# Patient Record
(demographics unavailable — no encounter records)

---

## 2024-11-07 NOTE — HISTORY OF PRESENT ILLNESS
[de-identified] : 68 year-old Ms. DICKEY is seen in consult for leukopenia that was first noted in 03/2023 when her WBC was 3.47, ANC 1.78. Her PCP repeated a CBC in April 2023 which showed even lower WBC (2.74). She has had normal counts before. Her H/H and Platelets are normal range. Patient denies being sick during this time except having a cold. She endorses being in good health all along. She denies any infection, fever. fatigue, weight loss. She denies taking any new drugs. Of note patient admits taking a beer most days.  [de-identified] : 6/23/2023: Initial visit  9/27/2023 Patient seen in follow up for leukopenia. Interim, she's been in good health. Denies frequent infection or illness. No fevers, night sweats, unintentional weight loss. Her last WBC was normal. Today her WBC is 2.94. No health concerns at this time. Will be traveling and returning in November.   5/2/2024 Patient returns for follow up for leukopenia. Her ANC and ALC remains boarder line low. She denies any infection, fever, fatigue, weight loss, night sweats. She has been in stable health and offers no complaints. She is going to get the second dose of her Shingles shot tomorrow. No concerns.   11/7/2024 Patient seen in follow up today. She has done well in the interim. Denies any infection, fever, fatigue, night sweats, weight loss. Her leukopenia is stable and persistent. Probably this is her normal count.  Discussed that there is no need for follow up any, more as her condition and the counts are stable. She voiced understanding.   *

## 2024-11-07 NOTE — ASSESSMENT
[FreeTextEntry1] : 68 year-old Ms. DICKEY is seen in consult for low WBC count contributed likely by neutrophils. Her hgb and platelets are normal range, MCV is normal. She denies any s/s of infections. Her WBC on her initial visit was normal. Suggestive of cyclic neutropenia, although a primary bone marrow process cannot be ruled out. Will follow up with surveillance, no plan for BMB now.   [] Leukopenia, neutropenia and lymphocytopenia, mild  - Had normal ANC in the until 12/2021 (per EMR)  - Normal H/H, MCV and Platelets  - No s/s of infection  - No new medication on her list  - Unclear etiology  - This may be her normal count - Cannot r/o early MDS  - No plan for BMBx at this time   - LOURDES Negative, Hepatitis, HIV - NEGATIVE - No routine hematology f/u needed  - CBC with PCP once a year  - Discharge from Hematology clinic  - Reconsult if needed   *       RTC in 6 months

## 2024-12-19 NOTE — PHYSICAL EXAM
[No Lymphadenopathy] : no lymphadenopathy [Normal] : no jugular venous distention, supple, no lymphadenopathy and the thyroid was normal and there were no nodules present

## 2024-12-19 NOTE — HISTORY OF PRESENT ILLNESS
[de-identified] : 68 y/o F PMHX HTN presents for BP check. Needs med refill. Saw Hematology  nothing further. Node in left side of neck smaller non tender

## 2025-03-20 NOTE — HISTORY OF PRESENT ILLNESS
[de-identified] : 69 y/o F PMHx HLD (Crestor), HTN ( Amlodipine, Olmesartan) presents for Medicare PE.

## 2025-03-20 NOTE — HEALTH RISK ASSESSMENT
[Very Good] : ~his/her~  mood as very good [2 - 3 times a week (3 pts)] : 2 - 3  times a week (3 points) [1 or 2 (0 pts)] : 1 or 2 (0 points) [No] : In the past 12 months have you used drugs other than those required for medical reasons? No [No falls in past year] : Patient reported no falls in the past year [0] : 2) Feeling down, depressed, or hopeless: Not at all (0) [PHQ-2 Negative - No further assessment needed] : PHQ-2 Negative - No further assessment needed [Yes] : takes [None] : Patient does not have any barriers to medication adherence [Never] : Never [Patient reported colonoscopy was normal] : Patient reported colonoscopy was normal [Alone] : lives alone [Feels Safe at Home] : Feels safe at home [Fully functional (bathing, dressing, toileting, transferring, walking, feeding)] : Fully functional (bathing, dressing, toileting, transferring, walking, feeding) [Fully functional (using the telephone, shopping, preparing meals, housekeeping, doing laundry, using] : Fully functional and needs no help or supervision to perform IADLs (using the telephone, shopping, preparing meals, housekeeping, doing laundry, using transportation, managing medications and managing finances) [Smoke Detector] : smoke detector [Carbon Monoxide Detector] : carbon monoxide detector [Patient/Caregiver not ready to engage] : , patient/caregiver not ready to engage [de-identified] : waks daily [[id number]] : 0 [Change in mental status noted] : No change in mental status noted [Sexually Active] : not sexually active [Reports changes in hearing] : Reports no changes in hearing [Reports changes in vision] : Reports no changes in vision [Reports changes in dental health] : Reports no changes in dental health [MammogramComments] : declines [PapSmearComments] : declines [ColonoscopyDate] : 2024 [ColonoscopyComments] : Cologuard 2024 [AdvancecareDate] : 3/20/2024

## 2025-03-22 NOTE — RESULTS/DATA
No care due was identified.  [facility] Embedded Care Due Messages. Reference number: [id number].   3/21/2025 5:31:27 PM CDT  
Refill Routing Note   Medication(s) are not appropriate for processing by [facility] for the following reason(s):        New or recently adjusted medication    [facility] action(s):  Defer             Appointments  past 12m or future 3m with PCP    Date Provider   Last Visit   2/18/2025 Sadie Ha, DO   Next Visit   5/19/2025 Sadie Ha, DO   ED visits in past 90 days: 0        Note composed:9:28 PM 03/21/2025               
[FreeTextEntry1] : 11/7/2024 WBC 3.36 ANC 1.74 Normal HGB and PLT  5/2/2024 WBC 3.38, ANC 1.75, ALC 0.92 Normal HGB and PLT  9/28/2023 WBC 2.94 Hgb and platelet WNL   6/23/23 Available labs reviewed in the EMR. WBC count noted to be mildly low since 03/2023 Normal H/H and platelets  Normal MCV

## 2025-05-19 NOTE — PHYSICAL EXAM
[Chaperoned Physical Exam] : A chaperone was present in the examining room during all aspects of the physical examination. [Abnormal] : Bimanual Exam: Abnormal [Normal] : Recto-Vaginal Exam: Normal [Fully active, able to carry on all pre-disease performance without restriction] : Status 0 - Fully active, able to carry on all pre-disease performance without restriction [de-identified] : 10 cm mobile uterus, cervix normal appearing

## 2025-05-19 NOTE — DISCUSSION/SUMMARY
[Reviewed Clinical Lab Test(s)] : Results of clinical tests were reviewed. [Reviewed Radiology Report(s)] : Radiology reports were reviewed. [Reviewed Radiology Film/Image(s)] : Images from radiology studies were reviewed and examined. [Discuss Tests w/Referring Providers] : Results of labs/radiology studies and the treatment recommendations were discussed with performing/referring physician. [Discuss Alternatives/Risks/Benefits w/Patient] : All alternatives, risks, and benefits were discussed with the patient/family and all questions were answered.  Patient expressed good understanding and appreciates the importance of follow up as recommended. [FreeTextEntry1] : 71 yo with findings suspicious for metastatic endometrial malignancy.   EMB done today  PET CT ordered to investigate upper abdominal cystic lesion. Lymphatic origin??  Discussed surgical planning with robotic assisted TLH BSO LAD, cysto possible open   We have discussed the risks of the planned procedure at length as well as the benefits and alternatives.   We discussed risk of bleeding and the possibility of blood transfusions, which the patient is amenable to in the event that it is needed.   We have discussed the possibility of infection, including superficial infection of the wound, the underlying tissues or deep infection in the abdomen or pelvis, as well as urinary tract infection and pneumonia.  We discussed the steps taken to mitigate that risk; but the possibility of need for antibiotics, a prolonged hospital stay, hospital readmission, or ICU stay remain.  We discussed the individual risk of infection is different for each patient and each procedure.   We have discussed risk of damage to surrounding structures, including the large and small bowel, bladder and ureters, blood vessels, and nerves. We discussed that we attempt to identify and fix any injury at the time of surgery, but that this may involve a more extensive operation that originally planned, the possible involvement of additional surgical subspecialists and a longer hospital stay than is typical.    We discussed risk of problems with healing of wounds related to the surgery. In addition to infection, we discussed wound separation, seroma, hematoma as well as possible scarring when healing leading to unsightly wounds. We discussed care of the wound post-op but that many times these problems cannot be predicted or prevented.   There is an increased risk of VTE and PE related to surgery and hospitalization especially in the setting of a cancer diagnosis. We discussed administration of heparin pre-operatively as well as during hospitalization if necessary to prevent formation of blood clots. We also discussed extended prophylaxis for 28 days post-op, should cancer be present and the patient undergo an open procedure.   We have discussed the post operative expectations for the surgery as planned, but deviations from the surgical plan may be required in order to achieve the surgical goals. This may necessitate additional post operative care, in-hospital or at home as determined by the situation. We discussed possible removal of abnormal appearing tissue in the abdominal cavity if it is seen and appendectomy if abnormal appearing. We discussed that with any minimally invasive or laparoscopic/robotic procedure there is a low, but not zero risk of conversion to an open surgery.   We discussed that there may be medical students, residents and fellows present during her procedure and participating in her post-operative care under my direct supervision. We discussed the importance of individuals participating in the procedure, including myself and assistants, to perform exam under anesthesia.  Path PET PST  Bonita Thao MD, FACOG  Gynecologic Oncology

## 2025-05-19 NOTE — PROCEDURE
[Endometrial Biopsy] : an endometrial biopsy [Postmenopausal Bleeding] : postmenopausal bleeding [Thickened Endometrium] : thickened endometrium [Patient] : the patient [Written consent] : written consent was obtained prior to the procedure and is detailed in the patient's record [None] : none [1% Lidocaine ___(ml)] :  [unfilled]Uml of 1% Lidocaine [Yes] : the specimen was sent to pathology [No Complications] : none [Tolerated Well] : the patient tolerated the procedure well [Post procedure instructions and information given] : post procedure instructions and information given and reviewed with patient. [0] : 0 [FreeTextEntry1] : sounded to 8cm  2 passes fleshy tissue

## 2025-05-19 NOTE — HISTORY OF PRESENT ILLNESS
[FreeTextEntry1] : Referred from [facility] ED  69 yo seen in [facility] ED last week for vaginal bleeding. Had a large gush of vaginal bleeding in the morning when she woke up. She wore a tampon and pad which were completely soaked in <2h.  Has not seen GYN Physician in long time. Sees PCP, has been relatively healthy. No concerns. Does not do mammography because her mother did not do it.  States possible staining when she wiped but unclear because she did not notice it.  Occ cramping.    The patient denies weight change, headache, fatigue, CP, SOB, abdominal pain, distension, constipation, diarrhea, urinary symptoms, extremity swelling, skin changes. She denies problems with sleep, depression or anxiety.  HCM: Pap: none recent Mammo: none Cscope: Cologuard  OBHx: G0 GynHx: Denies fibroids, cysts, endometriosis, STI's, Abnormal pap smears  PMH: HTN, HLD PSH: Tonsillectomy Meds: Amlodipine, Olmesartan, Crestor Allergies: Ibuprofen (facial swelling) Family History: Brother- gastric@65 recent dx, on chemo, surgery pending  Social History:  Denies smoking use, drug use, +occasional ETOH  FINDINGS: LOWER CHEST: Within normal limits.  LIVER: Visualized portions are within normal limits. BILE DUCTS: Normal caliber. GALLBLADDER: Within normal limits. SPLEEN: Visualized portions are within normal limits. PANCREAS: Within normal limits. ADRENALS: Within normal limits. KIDNEYS/URETERS: Symmetric homogeneous enhancement. No hydronephrosis. Multiple left renal peripelvic cysts.  BLADDER: An indeterminate punctate air pocket within the urinary bladder. REPRODUCTIVE ORGANS: Distended endometrium measuring 3.9 cm secondary to endometrial mass measuring approximately 6.0 x 4.6 x 3.8 cm. Bilateral adnexa are within normal limits.  BOWEL: No bowel obstruction. Appendix is normal. PERITONEUM/RETROPERITONEUM: Within normal limits. VESSELS: Atherosclerotic changes. LYMPH NODES: A few enlarged periaortic lymphadenopathy, for example a left para-aortic lymph node measuring 1.6 cm (3:41). ABDOMINAL WALL: Within normal limits. BONES: Degenerative changes.  IMPRESSION: Endometrial lesion as described above, compatible with endometrial neoplasm. Few enlarged retroperitoneal lymphadenopathy, suggestive of metastasis.  CT Chest 5/15/25 HEART/VASCULATURE: No pulmonary embolus. Enlarged heart. No pericardial effusion.  LUNGS/AIRWAYS/PLEURA: No pulmonary embolus. Small benign-appearing nodules along the cartilaginous portion of the trachea. Right upper lobe sub-4 mm solid nodule (14-23). No pleural effusion.  LYMPH NODES/MEDIASTINUM: Homogeneous circumscribed 3.9 cm cyst adjacent to the distal thoracic aorta the hiatus, likely lymphatic in origin. 7 mm nodule in the anterior mediastinum (14-63). Small hiatal hernia.  UPPER ABDOMEN: Unremarkable.  BONES/SOFT TISSUES: Symmetric nodular skin thickening of the breasts. Multilevel spondylosis.  IMPRESSION: No pulmonary embolus. Indeterminate subcentimeter anterior mediastinal lesion. Recommend follow-up chest CT with and without contrast in 3 months. Punctate right upper lobe nodule.  TVUS 05/15/2025   FINDINGS: Uterus: Anteverted. 7.7 cm x 4.6 cm x 6.3 cm. Within normal limits. Endometrium: 3.4 cm. Echogenic, vascularized mass within the endometrial  cavity associated with complex fluid, likely blood. Right ovary: 1.4 cm x 0.7 cm x 1.0 cm. Within normal limits. Left ovary: 1.5 cm x 0.8 cm x 0.9 cm. Within normal limits. Fluid: None. IMPRESSION: Large vascularized mass within the endometrium suspicious for endometrial  malignancy. Hematometra.

## 2025-06-13 NOTE — RESULTS/DATA
[] : results reviewed [de-identified] : H/H 13/39 [de-identified] : BUN/Creat 10/0.62, GFR 95 [de-identified] : NATHALY @ 78 [de-identified] : UA, A12c 5.4  imaging in ED showed REPRODUCTIVE ORGANS: Distended endometrium measuring 3.9 cm secondary to endometrial mass measuring approximately 6.0 x 4.6 x 3.8 cm. Bilateral adnexa are within normal limits.

## 2025-06-13 NOTE — PHYSICAL EXAM
[No Acute Distress] : no acute distress [Well Nourished] : well nourished [Well Developed] : well developed [Well-Appearing] : well-appearing [Normal Sclera/Conjunctiva] : normal sclera/conjunctiva [PERRL] : pupils equal round and reactive to light [EOMI] : extraocular movements intact [Normal Outer Ear/Nose] : the outer ears and nose were normal in appearance [Normal Oropharynx] : the oropharynx was normal [No JVD] : no jugular venous distention [No Lymphadenopathy] : no lymphadenopathy [Supple] : supple [Thyroid Normal, No Nodules] : the thyroid was normal and there were no nodules present [No Respiratory Distress] : no respiratory distress  [No Accessory Muscle Use] : no accessory muscle use [Clear to Auscultation] : lungs were clear to auscultation bilaterally [Normal Rate] : normal rate  [Regular Rhythm] : with a regular rhythm [Normal S1, S2] : normal S1 and S2 [No Murmur] : no murmur heard [No Carotid Bruits] : no carotid bruits [No Abdominal Bruit] : a ~M bruit was not heard ~T in the abdomen [No Varicosities] : no varicosities [Pedal Pulses Present] : the pedal pulses are present [No Edema] : there was no peripheral edema [No Palpable Aorta] : no palpable aorta [No Extremity Clubbing/Cyanosis] : no extremity clubbing/cyanosis [Soft] : abdomen soft [Non Tender] : non-tender [Non-distended] : non-distended [No Masses] : no abdominal mass palpated [No HSM] : no HSM [Normal Bowel Sounds] : normal bowel sounds [Normal Posterior Cervical Nodes] : no posterior cervical lymphadenopathy [Normal Anterior Cervical Nodes] : no anterior cervical lymphadenopathy [No CVA Tenderness] : no CVA  tenderness [No Spinal Tenderness] : no spinal tenderness [No Joint Swelling] : no joint swelling [Grossly Normal Strength/Tone] : grossly normal strength/tone [No Rash] : no rash [Coordination Grossly Intact] : coordination grossly intact [No Focal Deficits] : no focal deficits [Normal Gait] : normal gait [Deep Tendon Reflexes (DTR)] : deep tendon reflexes were 2+ and symmetric [Normal Affect] : the affect was normal [Normal Insight/Judgement] : insight and judgment were intact [Declined Breast Exam] : declined breast exam  [Normal Supraclavicular Nodes] : no supraclavicular lymphadenopathy [Speech Grossly Normal] : speech grossly normal [Alert and Oriented x3] : oriented to person, place, and time [Normal Mood] : the mood was normal

## 2025-06-13 NOTE — RESULTS/DATA
[] : results reviewed [de-identified] : H/H 13/39 [de-identified] : BUN/Creat 10/0.62, GFR 95 [de-identified] : NATHALY @ 78 [de-identified] : UA, A12c 5.4  imaging in ED showed REPRODUCTIVE ORGANS: Distended endometrium measuring 3.9 cm secondary to endometrial mass measuring approximately 6.0 x 4.6 x 3.8 cm. Bilateral adnexa are within normal limits.

## 2025-06-13 NOTE — HISTORY OF PRESENT ILLNESS
[No Pertinent Cardiac History] : no history of aortic stenosis, atrial fibrillation, coronary artery disease, recent myocardial infarction, or implantable device/pacemaker [No Pertinent Pulmonary History] : no history of asthma, COPD, sleep apnea, or smoking [No Adverse Anesthesia Reaction] : no adverse anesthesia reaction in self or family member [(Patient denies any chest pain, claudication, dyspnea on exertion, orthopnea, palpitations or syncope)] : Patient denies any chest pain, claudication, dyspnea on exertion, orthopnea, palpitations or syncope [Excellent (>10 METs)] : Excellent (>10 METs) [Chronic Anticoagulation] : no chronic anticoagulation [Chronic Kidney Disease] : no chronic kidney disease [Diabetes] : no diabetes [FreeTextEntry1] : endometrial mass removal [FreeTextEntry2] : 06/17/25 [FreeTextEntry3] : MD Bonita Thao [FreeTextEntry4] : 69 yo female with PMHx HTN HLD presents to the office for pre operative clearance for endometrial mass removal.  [FreeTextEntry7] : ECG done NSR @ 78 bpm

## 2025-06-13 NOTE — HISTORY OF PRESENT ILLNESS
[No Pertinent Cardiac History] : no history of aortic stenosis, atrial fibrillation, coronary artery disease, recent myocardial infarction, or implantable device/pacemaker [No Pertinent Pulmonary History] : no history of asthma, COPD, sleep apnea, or smoking [No Adverse Anesthesia Reaction] : no adverse anesthesia reaction in self or family member [(Patient denies any chest pain, claudication, dyspnea on exertion, orthopnea, palpitations or syncope)] : Patient denies any chest pain, claudication, dyspnea on exertion, orthopnea, palpitations or syncope [Excellent (>10 METs)] : Excellent (>10 METs) [Chronic Anticoagulation] : no chronic anticoagulation [Chronic Kidney Disease] : no chronic kidney disease [Diabetes] : no diabetes [FreeTextEntry1] : endometrial mass removal [FreeTextEntry2] : 06/17/25 [FreeTextEntry3] : MD Bonita Thao [FreeTextEntry7] : ECG done NSR @ 78 bpm [FreeTextEntry4] : 71 yo female with PMHx HTN HLD presents to the office for pre operative clearance for endometrial mass removal.

## 2025-06-13 NOTE — COUNSELING
[Behavioral health counseling provided] : behavioral health  [Sleep ___ hours/day] : Sleep [unfilled] hours/day [Engage in a relaxing activity] : Engage in a relaxing activity

## 2025-07-02 NOTE — DISCUSSION/SUMMARY
[Clean] : was clean [Dry] : was dry [Intact] : was intact [None] : had no drainage [Normal Skin] : normal appearance [Doing Well] : is doing well [Excellent Pain Control] : has excellent pain control [No Sign of Infection] : is showing no signs of infection [0] : 0 [Reviewed] : reviewed [Firm] : soft [Tender] : nontender [FreeTextEntry1] : Surgical Pathology Report - Auth (Verified) Specimen(s) Submitted 1- Uterus,cervix,bilateral fallopian tubes and ovaries 2- Right para-aortic lymph node 3- Left para-aortic lymph nodes 4- Omentum   Final Diagnosis 1  Uterus, cervix, bilateral fallopian tubes and ovaries - 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 Immunohistochemistry for p53 on prior material was aberrant while the DNA MMR proteins all showed retained expression.  An immunohistochemical stain for HER2 is pending and the results will be reported in an addendum.  2  Right para-aortic lymph node - Metastatic carcinoma involving one of one lymph node (1/1)  3  Left para-aortic lymph nodes - Metastatic carcinoma involving two of two lymph nodes (2/2)  4  Omentum - Benign fibroadipose tissue   HER-2: Negative  - Score 0 Result: No staining observed or incomplete, faint or barely perceptible membrane staining in ?    10% of tumor cells.

## 2025-07-02 NOTE — REASON FOR VISIT
[Post Op] : post op visit [de-identified] : 6/17/25 [de-identified] : RA TLH BSO lymph node dissection, omentectomy

## 2025-07-02 NOTE — ASSESSMENT
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   TB Recommendation: Carboplatin/paclitaxel, EBRT  Discussed TB recommendations with patient  Recommend chemotherapy with carboplatin and paclitaxel followed by EBRT for stage IIIC2 p53abn endometrial cancer.  Discussed expectations. Patient would like to have infusion at [facility] Will email navigation for med onc/rad onc appts  RTC 4 weeks for cuff check  Bonita Thao MD, FACOG  Gynecologic Oncology

## 2025-07-29 NOTE — HISTORY OF PRESENT ILLNESS
[de-identified] : 69 y/o F PMHx recent diagnosis of uterine CA stage 111C2, (s/p RA TLH BSO)  HTN . Pt seeing Oncologist next week. Admits to palpitations post surgery. Lasts 5 minutes.

## 2025-07-29 NOTE — PHYSICAL EXAM
[Normal] : affect was normal and insight and judgment were intact [de-identified] : dinora @100  with infrequent ectopy

## 2025-07-29 NOTE — PHYSICAL EXAM
[Normal] : affect was normal and insight and judgment were intact [de-identified] : dinora @100  with infrequent ectopy

## 2025-07-29 NOTE — HISTORY OF PRESENT ILLNESS
[de-identified] : 71 y/o F PMHx recent diagnosis of uterine CA stage 111C2, (s/p RA TLH BSO)  HTN . Pt seeing Oncologist next week. Admits to palpitations post surgery. Lasts 5 minutes.

## 2025-07-30 NOTE — PHYSICAL EXAM
[Fully active, able to carry on all pre-disease performance without restriction] : Status 0 - Fully active, able to carry on all pre-disease performance without restriction [Normal] : normoactive bowel sounds, soft and nontender, no hepatosplenomegaly or masses appreciated [de-identified] : scars from laparoscopic surgery, healed and clean [de-identified] : LE edema

## 2025-07-30 NOTE — PHYSICAL EXAM
[Fully active, able to carry on all pre-disease performance without restriction] : Status 0 - Fully active, able to carry on all pre-disease performance without restriction [Normal] : normoactive bowel sounds, soft and nontender, no hepatosplenomegaly or masses appreciated [de-identified] : scars from laparoscopic surgery, healed and clean [de-identified] : LE edema

## 2025-07-30 NOTE — HISTORY OF PRESENT ILLNESS
[Disease: _____________________] : Disease: [unfilled] [AJCC Stage: ____] : AJCC Stage: [unfilled] [de-identified] : 70F presenting for initial consultation for newly diagnosed uterine cancer.   Back in May, the patient experienced a significant episode of vaginal bleeding in the morning. She wore a tampon and pad, both of which were completely soaked within two hours. A CT scan performed in mid-May revealed a distended endometrial cavity measuring 3.9 cm, secondary to an endometrial mass measuring approximately 6.0 x 4.6 x 3.8 cm. The scan also showed a few enlarged periaortic and retroperitoneal lymph nodes, raising concern for possible metastatic disease.  She is s/p RA TL BSO lymph node dissection, omentectomy on 6/17/25 with Dr. Thao. Pathology showed endometrium with high grade adenocarcinoma with serous and clear cell features, myometrial invasion of 9 out of 10 with b/l para-aortic lymph nodes positive for metastasis.    Past medical history: HTN and HLD Medications: Olmesartan, Amlodipine, Rosuvastatin Past surgical history: tonsillectomy as a child, RA TL BSO lymph node dissection, omentectomy on 6/17/25 Family Hx: Brother recently diagnosed in December 2024 with gastric cancer (age 65). Parents with no hx of cancer. Patient does not have children. Social Hx: Never smoker, social drinker (1 drink per week with friends), denies recreational drug use. 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 Boyfriend recently passed away. Allergies: Ibuprofen - facial swelling   [de-identified] : Patient states that she feels well today, no major complaints. Her incision sites are healed well and clean and she is to follow up with Dr. Thao for the 6 week post op appointment.

## 2025-07-30 NOTE — ASSESSMENT
[FreeTextEntry1] : #Stage IIIC Serous Carcinoma of the Uterus -Will plan to start Carboplatin/Taxol and Dostarlimab (total 6 cycles) for now -Discussed with the patient regarding the side effects of chemotherapy such as, but not limited to, fatigue, nausea/vomiting, cytopenias and hair loss. Also, discussed regarding side effects of immunotherapy such as, but not limited to, increased inflammation resulting in diarrhea, rashes or thyroid dysfunction.  -Will consult IR for port placement given patient will likely be on dostarlimab as maintenance therapy for 3 years -Will consult Rad/Onc for further recommendations on plans for RT -Obtain bloodwork today and will collect ctDNA to monitor for residual disease -will follow up 1 week after receiving first treatment   Case discussed with Dr. Bonner. Attending addendum to follow.   Moisés De Paz, PGY-4 Hematology/Oncology Fellow

## 2025-07-30 NOTE — HISTORY OF PRESENT ILLNESS
[Disease: _____________________] : Disease: [unfilled] [AJCC Stage: ____] : AJCC Stage: [unfilled] [de-identified] : 70F presenting for initial consultation for newly diagnosed uterine cancer.   Back in May, the patient experienced a significant episode of vaginal bleeding in the morning. She wore a tampon and pad, both of which were completely soaked within two hours. A CT scan performed in mid-May revealed a distended endometrial cavity measuring 3.9 cm, secondary to an endometrial mass measuring approximately 6.0 x 4.6 x 3.8 cm. The scan also showed a few enlarged periaortic and retroperitoneal lymph nodes, raising concern for possible metastatic disease.  She is s/p RA TL BSO lymph node dissection, omentectomy on 6/17/25 with Dr. Thao. Pathology showed endometrium with high grade adenocarcinoma with serous and clear cell features, myometrial invasion of 9 out of 10 with b/l para-aortic lymph nodes positive for metastasis.    Past medical history: HTN and HLD Medications: Olmesartan, Amlodipine, Rosuvastatin Past surgical history: tonsillectomy as a child, RA TL BSO lymph node dissection, omentectomy on 6/17/25 Family Hx: Brother recently diagnosed in December 2024 with gastric cancer (age 65). Parents with no hx of cancer. Patient does not have children. Social Hx: Never smoker, social drinker (1 drink per week with friends), denies recreational drug use. 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 Allergies: Ibuprofen - facial swelling   [de-identified] : Patient states that she feels well today, no major complaints. Her incision sites are healed well and clean and she is to follow up with Dr. Thao for the 6 week post op appointment.

## 2025-07-30 NOTE — REASON FOR VISIT
[Initial Consultation] : an initial consultation [Family Member] : family member [FreeTextEntry2] : Uterine Cancer

## 2025-07-30 NOTE — HISTORY OF PRESENT ILLNESS
[Disease: _____________________] : Disease: [unfilled] [AJCC Stage: ____] : AJCC Stage: [unfilled] [de-identified] : 70F presenting for initial consultation for newly diagnosed uterine cancer.   Back in May, the patient experienced a significant episode of vaginal bleeding in the morning. She wore a tampon and pad, both of which were completely soaked within two hours. A CT scan performed in mid-May revealed a distended endometrial cavity measuring 3.9 cm, secondary to an endometrial mass measuring approximately 6.0 x 4.6 x 3.8 cm. The scan also showed a few enlarged periaortic and retroperitoneal lymph nodes, raising concern for possible metastatic disease.  She is s/p RA TL BSO lymph node dissection, omentectomy on 6/17/25 with Dr. Thao. Pathology showed endometrium with high grade adenocarcinoma with serous and clear cell features, myometrial invasion of 9 out of 10 with b/l para-aortic lymph nodes positive for metastasis.    Past medical history: HTN and HLD Medications: Olmesartan, Amlodipine, Rosuvastatin Past surgical history: tonsillectomy as a child, RA TL BSO lymph node dissection, omentectomy on 6/17/25 Family Hx: Brother recently diagnosed in December 2024 with gastric cancer (age 65). Parents with no hx of cancer. Patient does not have children. Social Hx: Never smoker, social drinker (1 drink per week with friends), denies recreational drug use. 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 Allergies: Ibuprofen - facial swelling   [de-identified] : Patient states that she feels well today, no major complaints. Her incision sites are healed well and clean and she is to follow up with Dr. Thao for the 6 week post op appointment.

## 2025-07-30 NOTE — GOALS
[Patient] : patient [Staff: _____] : staff - [unfilled] [FreeTextEntry1] : Maury Leal [FreeTextEntry2] : brother [FreeTextEntry3] : [phone] [FreeTextEntry7] : HCP discussed and signed. Copy scanned into [employer].

## 2025-07-30 NOTE — GOALS
[Patient] : patient [Staff: _____] : staff - [unfilled] [FreeTextEntry1] : Maury Leal [FreeTextEntry2] : brother [FreeTextEntry3] : [phone] [FreeTextEntry7] : HCP discussed and signed. Copy scanned into [employer].

## 2025-07-30 NOTE — GOALS
[Patient] : patient [Staff: _____] : staff - [unfilled] [FreeTextEntry1] : Maury Leal [FreeTextEntry2] : brother [FreeTextEntry3] : [phone] [FreeTextEntry7] : HCP discussed and signed. Copy scanned into [employer].

## 2025-07-30 NOTE — PHYSICAL EXAM
[Fully active, able to carry on all pre-disease performance without restriction] : Status 0 - Fully active, able to carry on all pre-disease performance without restriction [Normal] : normoactive bowel sounds, soft and nontender, no hepatosplenomegaly or masses appreciated [de-identified] : scars from laparoscopic surgery, healed and clean [de-identified] : LE edema

## 2025-07-30 NOTE — REVIEW OF SYSTEMS
[Fever] : no fever [Chills] : no chills [Chest Pain] : no chest pain [Palpitations] : no palpitations [Shortness Of Breath] : no shortness of breath [Abdominal Pain] : no abdominal pain [Vomiting] : no vomiting [Constipation] : no constipation [Dysuria] : no dysuria [Joint Pain] : no joint pain [Confused] : no confusion